# Patient Record
Sex: FEMALE | URBAN - METROPOLITAN AREA
[De-identification: names, ages, dates, MRNs, and addresses within clinical notes are randomized per-mention and may not be internally consistent; named-entity substitution may affect disease eponyms.]

---

## 2024-08-07 ENCOUNTER — PATIENT OUTREACH (OUTPATIENT)
Dept: CARE COORDINATION | Facility: CLINIC | Age: 59
End: 2024-08-07

## 2024-08-07 NOTE — PROGRESS NOTES
Clinic Care Coordination Contact  Program:   [facility]: [place]     Renewal:[facility]   Date Applied:      SHANITA Outreach:   8/7/24: 1st outreach attempt. Left a message on voicemail with call back information and requested return call.  Plan: CTA will call again within 2 weeks.  Sandi Paul  Care   [facility]  Clinic Care Coordination  [phone]        Health Insurance:        Referral/Screening:

## 2024-08-21 ENCOUNTER — PATIENT OUTREACH (OUTPATIENT)
Dept: CARE COORDINATION | Facility: CLINIC | Age: 59
End: 2024-08-21

## 2024-08-21 NOTE — PROGRESS NOTES
Clinic Care Coordination Contact  Program:   [facility]: [place]     Renewal:[facility]   Date Applied:      SHANITA Outreach:   8/21/24: 2nd outreach attempt. Left message on voicemail indicating last outreach attempt. CTA left [facility] number for renewal follow up.  Plan: CTA will no longer make outreach  Sandi Guaman   [facility]  Clinic Care Coordination  [phone]    8/7/24: 1st outreach attempt. Left a message on voicemail with call back information and requested return call.  Plan: CTA will call again within 2 weeks.  Sandi Guaman   [facility]  Clinic Care Coordination  [phone]        Health Insurance:        Referral/Screening: